# Patient Record
Sex: FEMALE | URBAN - METROPOLITAN AREA
[De-identification: names, ages, dates, MRNs, and addresses within clinical notes are randomized per-mention and may not be internally consistent; named-entity substitution may affect disease eponyms.]

---

## 2018-06-07 ENCOUNTER — APPOINTMENT (RX ONLY)
Age: 58
Setting detail: DERMATOLOGY
End: 2018-06-07

## 2018-06-07 DIAGNOSIS — Z86.006 PERSONAL HISTORY OF MELANOMA IN-SITU: ICD-10-CM

## 2018-06-07 DIAGNOSIS — L57.8 OTHER SKIN CHANGES DUE TO CHRONIC EXPOSURE TO NONIONIZING RADIATION: ICD-10-CM

## 2018-06-07 DIAGNOSIS — Z85.828 PERSONAL HISTORY OF OTHER MALIGNANT NEOPLASM OF SKIN: ICD-10-CM

## 2018-06-07 DIAGNOSIS — D22 MELANOCYTIC NEVI: ICD-10-CM

## 2018-06-07 PROBLEM — D22.62 MELANOCYTIC NEVI OF LEFT UPPER LIMB, INCLUDING SHOULDER: Status: ACTIVE | Noted: 2018-06-07

## 2018-06-07 PROBLEM — D22.9 MELANOCYTIC NEVI, UNSPECIFIED: Status: ACTIVE | Noted: 2018-06-07

## 2018-06-07 PROBLEM — D22.5 MELANOCYTIC NEVI OF TRUNK: Status: ACTIVE | Noted: 2018-06-07

## 2018-06-07 PROBLEM — Z85.820 PERSONAL HISTORY OF MALIGNANT MELANOMA OF SKIN: Status: ACTIVE | Noted: 2018-06-07

## 2018-06-07 PROBLEM — D48.5 NEOPLASM OF UNCERTAIN BEHAVIOR OF SKIN: Status: ACTIVE | Noted: 2018-06-07

## 2018-06-07 PROBLEM — D22.61 MELANOCYTIC NEVI OF RIGHT UPPER LIMB, INCLUDING SHOULDER: Status: ACTIVE | Noted: 2018-06-07

## 2018-06-07 PROCEDURE — 11101: CPT

## 2018-06-07 PROCEDURE — 11100: CPT

## 2018-06-07 PROCEDURE — ? BIOPSY BY SHAVE METHOD

## 2018-06-07 PROCEDURE — ? OBSERVATION

## 2018-06-07 PROCEDURE — 99203 OFFICE O/P NEW LOW 30 MIN: CPT | Mod: 25

## 2018-06-07 PROCEDURE — ? BIOPSY BY PUNCH METHOD

## 2018-06-07 PROCEDURE — ? COUNSELING

## 2018-06-07 ASSESSMENT — LOCATION ZONE DERM
LOCATION ZONE: TRUNK
LOCATION ZONE: ARM

## 2018-06-07 ASSESSMENT — LOCATION DETAILED DESCRIPTION DERM
LOCATION DETAILED: LEFT ANTERIOR PROXIMAL UPPER ARM
LOCATION DETAILED: INFERIOR THORACIC SPINE
LOCATION DETAILED: XIPHOID
LOCATION DETAILED: RIGHT LATERAL SUPERIOR CHEST
LOCATION DETAILED: RIGHT ANTERIOR PROXIMAL UPPER ARM

## 2018-06-07 ASSESSMENT — LOCATION SIMPLE DESCRIPTION DERM
LOCATION SIMPLE: UPPER BACK
LOCATION SIMPLE: RIGHT UPPER ARM
LOCATION SIMPLE: LEFT UPPER ARM
LOCATION SIMPLE: ABDOMEN
LOCATION SIMPLE: CHEST

## 2018-06-07 NOTE — HPI: EVALUATION OF SKIN LESION(S)
Hpi Title: Evaluation of Skin Lesions
How Severe Are Your Spot(S)?: mild
Have Your Spot(S) Been Treated In The Past?: has not been treated
Location: Abdomen
Year Removed: 2017

## 2018-06-07 NOTE — PROCEDURE: BIOPSY BY PUNCH METHOD
Patient Will Remove Sutures At Home?: Yes
Punch Size In Mm: 4
Wound Care: Aquaphor
X Size Of Lesion In Cm (Optional): 0
Suture Removal: 10 days
Detail Level: Detailed
Consent: Verbal consent was obtained and risks were reviewed including but not limited to scarring, infection, bleeding, scabbing, incomplete removal, nerve damage and allergy to anesthesia.
Notification Instructions: Patient will be notified of biopsy results. However, patient instructed to call the office if not contacted within 2 weeks.
Dressing: sterile steri-strips and sterile pressure dressing
Home Suture Removal Text: .
Post-Care Instructions: Today you had a skin biopsy done at [facility].\\n\\nLeave the bandage in place for 24 hours. After 24 hours, remove the bandage and clean with gentle soap and water. Please clean the site with soap and water at least daily. Apply Vaseline or Aquaphor ointment to the site at least once or twice daily.\\n\\nAfter the first 24 hours, you may leave the site uncovered or, if you choose, you may cover it with a new bandage. \\n\\nIf a scab develops over the biopsy site, please do not pick it off. \\n \\nPlease keep the area protected from theh sun with sun screen or protective clothing, such as a hat. \\n\\nPlease return to the office to have the sutures removed in 10 days.\\n\\nIf you develop increasing pain, redness, or drainage at the site, or any other concerns, please contact us.\\n\\nYou should receive your biopsy results generally within 7 to 14 days (either at a return visit or by phone). If you do not receive your results within 2 weeks, it is important that you contact us.
Bill For Surgical Tray: no
Anesthesia Volume In Cc: 0.5
Biopsy Type: H and E
Hemostasis: None
Epidermal Sutures: 6-0 Surgipro
Anesthesia Type: 1% lidocaine with epinephrine
Billing Type: Third-Party Bill

## 2018-06-07 NOTE — PROCEDURE: BIOPSY BY SHAVE METHOD
Consent: Written consent was obtained and risks were reviewed including but not limited to scarring, infection, bleeding, scabbing, incomplete removal, nerve damage and allergy to anesthesia.
Type Of Destruction Used: Electrodesiccation and Curettage
Hemostasis: Electrocautery
Anesthesia Volume In Cc: 0.3
Render Post-Care Instructions In Note?: no
Additional Anesthesia Volume In Cc (Will Not Render If 0): 0
Detail Level: Detailed
Silver Nitrate Text: The wound bed was treated with silver nitrate after the biopsy was performed.
Biopsy Method: 15 blade
Dressing: sterile steri-strips and sterile pressure dressing
Electrodesiccation Text: The wound bed was treated with electrodesiccation after the biopsy was performed.
Wound Care: Aquaphor
Was A Bandage Applied: Yes
Curettage Text: The wound bed was treated with curettage after the biopsy was performed.
Billing Type: Third-Party Bill
Notification Instructions: Patient will be notified of biopsy results. However, patient instructed to call the office if not contacted within 2 weeks.
Biopsy Type: H and E
Anesthesia Type: 1% lidocaine with epinephrine
Electrodesiccation And Curettage Text: The wound bed was treated with electrodesiccation and curettage after the biopsy was performed.
Post-Care Instructions: Wound Care Instructions for SHAVE BIOPSY\\n\\n-Leave the bandage in place for 24 hours.\\n-Remove the bandage, wash the wound gently with soapy water, rinse and gently pat dry. \\n-Apply Vaseline or Aquaphor ointment to the wound, then cover with a non stick bandage (i.e. BandAid)\\n-Repeat this process daily until the wound dries up (usually 3-7 days); if a scab forms, leave it in place\\n\\n-Keep the wound protected from the sun as sunlight worsens scarring. \\n\\n-If you notice increasing redness, pain, swelling, and/or increasing drainage, or if you have any other concerns, please contact the clinic: [phone]\\n\\n-You should receive your results within 7-14 days after the biopsy. If you do not receive your results in this time frame, please contact the clinic: [phone]
Cryotherapy Text: The wound bed was treated with cryotherapy after the biopsy was performed.

## 2025-05-16 NOTE — PROCEDURE: OBSERVATION
Attempted call C# just gets cont ring  
Glenny calling from [facility] requesting Verbal order.[phone]  
X Size Of Lesion In Cm (Optional): 0
Detail Level: Zone
Detail Level: Detailed